# Patient Record
Sex: FEMALE | Race: WHITE | Employment: FULL TIME | URBAN - METROPOLITAN AREA
[De-identification: names, ages, dates, MRNs, and addresses within clinical notes are randomized per-mention and may not be internally consistent; named-entity substitution may affect disease eponyms.]

---

## 2019-03-29 ENCOUNTER — TELEPHONE (OUTPATIENT)
Dept: FAMILY MEDICINE CLINIC | Age: 44
End: 2019-03-29

## 2019-03-29 NOTE — TELEPHONE ENCOUNTER
Pt stopped in asking fro rx of flexeril and medrol rosanne for back pain, was at Dr Royce Lucas office hand this is what he recommended. For back pain  Pt uses [facility].     cpb

## 2019-04-02 ENCOUNTER — OFFICE VISIT (OUTPATIENT)
Dept: FAMILY MEDICINE CLINIC | Age: 44
End: 2019-04-02
Payer: COMMERCIAL

## 2019-04-02 VITALS
RESPIRATION RATE: 16 BRPM | HEIGHT: 64 IN | SYSTOLIC BLOOD PRESSURE: 108 MMHG | WEIGHT: 193.6 LBS | DIASTOLIC BLOOD PRESSURE: 72 MMHG | HEART RATE: 76 BPM | TEMPERATURE: 98.4 F | BODY MASS INDEX: 33.05 KG/M2

## 2019-04-02 DIAGNOSIS — M54.50 ACUTE LOW BACK PAIN WITHOUT SCIATICA, UNSPECIFIED BACK PAIN LATERALITY: Primary | ICD-10-CM

## 2019-04-02 PROCEDURE — 99213 OFFICE O/P EST LOW 20 MIN: CPT | Performed by: FAMILY MEDICINE

## 2019-04-02 RX ORDER — CYCLOBENZAPRINE HCL 10 MG
10 TABLET ORAL 3 TIMES DAILY PRN
Qty: 30 TABLET | Refills: 0 | Status: SHIPPED | OUTPATIENT
Start: 2019-04-02 | End: 2019-04-12

## 2019-04-02 RX ORDER — METHYLPREDNISOLONE 4 MG/1
TABLET ORAL
Qty: 1 KIT | Refills: 0 | Status: SHIPPED | OUTPATIENT
Start: 2019-04-02 | End: 2019-04-08

## 2019-04-02 ASSESSMENT — ENCOUNTER SYMPTOMS
GASTROINTESTINAL NEGATIVE: 1
BACK PAIN: 1
ABDOMINAL PAIN: 0
RESPIRATORY NEGATIVE: 1

## 2019-04-02 ASSESSMENT — PATIENT HEALTH QUESTIONNAIRE - PHQ9
SUM OF ALL RESPONSES TO PHQ QUESTIONS 1-9: 0
SUM OF ALL RESPONSES TO PHQ9 QUESTIONS 1 & 2: 0
1. LITTLE INTEREST OR PLEASURE IN DOING THINGS: 0
SUM OF ALL RESPONSES TO PHQ QUESTIONS 1-9: 0
2. FEELING DOWN, DEPRESSED OR HOPELESS: 0

## 2019-04-02 NOTE — PROGRESS NOTES
Chief Complaint   Patient presents with    Lower Back Pain     Present x 4 weeks. Lifted a laundry basket and has had issues since. Seeing chiropractor. To have MRI Thursday @ [facility]. Denies numbness and tingling. Using Advil with some benefit. SUBJECTIVE     Tatiana Bearden is a 41 y. o.female      Pt complains of a 4 week h/o low back pain that is non-radiating. Started after she bent over while picking up a laundry basket. Felt immediate pain. Denies numbness, tingling, weakness. Has been doing chiropractics. Has MRI scheduled for later this week as ordered by her chiropractor. No previous h/o back issues. Has been using Aleve without much relief. C/o tightness in the back. Unable to rest well due to pain. Denies bowel or bladder problems. Review of Systems   Constitutional: Negative for fatigue and fever. HENT: Negative. Respiratory: Negative. Cardiovascular: Negative. Gastrointestinal: Negative. Negative for abdominal pain. Genitourinary: Negative. Negative for flank pain and hematuria. Musculoskeletal: Positive for back pain. Neurological: Negative for weakness and numbness. All other systems reviewed and are negative. OBJECTIVE     /72 (Site: Right Upper Arm, Position: Sitting, Cuff Size: Medium Adult)   Pulse 76   Temp 98.4 °F (36.9 °C) (Oral)   Resp 16   Ht 5' 4\" (1.626 m)   Wt 193 lb 9.6 oz (87.8 kg)   BMI 33.23 kg/m²     Physical Exam   Constitutional: She appears well-developed and well-nourished. HENT:   Right Ear: Tympanic membrane normal.   Left Ear: Tympanic membrane normal.   Mouth/Throat: Oropharynx is clear and moist.   Cardiovascular: Normal rate and regular rhythm. No murmur heard. Pulmonary/Chest: Breath sounds normal. She has no wheezes. Musculoskeletal: She exhibits no edema. Lumbar back: She exhibits decreased range of motion and spasm. She exhibits no bony tenderness.         Back:    Lymphadenopathy:     She has no cervical adenopathy. Vitals reviewed. ASSESSMENT      1.  Acute low back pain without sciatica, unspecified back pain laterality        PLAN     Requested Prescriptions     Signed Prescriptions Disp Refills    cyclobenzaprine (FLEXERIL) 10 MG tablet 30 tablet 0     Sig: Take 1 tablet by mouth 3 times daily as needed for Muscle spasms    methylPREDNISolone (MEDROL DOSEPACK) 4 MG tablet 1 kit 0     Sig: Take by mouth as directed         Follow up if not better            Electronically signed by Gonzalo Luke MD on 4/2/2019 at 1:36 PM

## 2020-01-21 ENCOUNTER — HOSPITAL ENCOUNTER (OUTPATIENT)
Dept: WOMENS IMAGING | Age: 45
Discharge: HOME | End: 2020-01-21
Payer: COMMERCIAL

## 2020-01-21 PROCEDURE — 77063 BREAST TOMOSYNTHESIS BI: CPT

## 2020-01-24 ENCOUNTER — HOSPITAL ENCOUNTER (OUTPATIENT)
Dept: WOMENS IMAGING | Age: 45
Discharge: HOME | End: 2020-01-24
Payer: COMMERCIAL

## 2020-01-24 PROCEDURE — G0279 TOMOSYNTHESIS, MAMMO: HCPCS

## 2020-01-24 PROCEDURE — 76642 ULTRASOUND BREAST LIMITED: CPT

## 2020-04-30 ENCOUNTER — E-VISIT (OUTPATIENT)
Dept: FAMILY MEDICINE CLINIC | Facility: CLINIC | Age: 45
End: 2020-04-30
Payer: COMMERCIAL

## 2020-04-30 PROCEDURE — 98970 NQHP OL DIG ASSMT&MGMT 5-10: CPT | Performed by: NURSE PRACTITIONER

## 2020-09-29 ENCOUNTER — HOSPITAL ENCOUNTER (OUTPATIENT)
Dept: WOMENS IMAGING | Age: 45
Discharge: HOME | End: 2020-09-29
Payer: COMMERCIAL

## 2020-09-29 ENCOUNTER — HOSPITAL ENCOUNTER (OUTPATIENT)
Dept: WOMENS IMAGING | Age: 45
End: 2020-09-29
Payer: COMMERCIAL

## 2020-09-29 PROCEDURE — G0279 TOMOSYNTHESIS, MAMMO: HCPCS

## 2021-01-07 ENCOUNTER — TELEPHONE (OUTPATIENT)
Dept: FAMILY MEDICINE CLINIC | Age: 46
End: 2021-01-07

## 2021-01-07 DIAGNOSIS — Z20.822 EXPOSURE TO COVID-19 VIRUS: Primary | ICD-10-CM

## 2021-01-07 NOTE — TELEPHONE ENCOUNTER
Patient with recent exposure to COVID and is requesting testing at this time. Asymptomatic. Would like to go to [facility]. Order faxed and patient notified.

## 2021-01-08 LAB — SARS-COV-2: NOT DETECTED

## 2021-02-22 ENCOUNTER — VIRTUAL VISIT (OUTPATIENT)
Dept: FAMILY MEDICINE CLINIC | Age: 46
End: 2021-02-22
Payer: COMMERCIAL

## 2021-02-22 ENCOUNTER — HOSPITAL ENCOUNTER (OUTPATIENT)
Age: 46
Setting detail: SPECIMEN
Discharge: HOME | End: 2021-02-22
Payer: COMMERCIAL

## 2021-02-22 DIAGNOSIS — R51.9 NONINTRACTABLE HEADACHE, UNSPECIFIED CHRONICITY PATTERN, UNSPECIFIED HEADACHE TYPE: ICD-10-CM

## 2021-02-22 DIAGNOSIS — R05.9 COUGH: ICD-10-CM

## 2021-02-22 DIAGNOSIS — R52 BODY ACHES: ICD-10-CM

## 2021-02-22 DIAGNOSIS — R50.9 FEVER, UNSPECIFIED FEVER CAUSE: ICD-10-CM

## 2021-02-22 DIAGNOSIS — Z20.822 SUSPECTED COVID-19 VIRUS INFECTION: ICD-10-CM

## 2021-02-22 DIAGNOSIS — Z20.822 SUSPECTED COVID-19 VIRUS INFECTION: Primary | ICD-10-CM

## 2021-02-22 PROCEDURE — 99213 OFFICE O/P EST LOW 20 MIN: CPT | Performed by: NURSE PRACTITIONER

## 2021-02-22 PROCEDURE — G8427 DOCREV CUR MEDS BY ELIG CLIN: HCPCS | Performed by: NURSE PRACTITIONER

## 2021-02-22 PROCEDURE — U0003 INFECTIOUS AGENT DETECTION BY NUCLEIC ACID (DNA OR RNA); SEVERE ACUTE RESPIRATORY SYNDROME CORONAVIRUS 2 (SARS-COV-2) (CORONAVIRUS DISEASE [COVID-19]), AMPLIFIED PROBE TECHNIQUE, MAKING USE OF HIGH THROUGHPUT TECHNOLOGIES AS DESCRIBED BY CMS-2020-01-R: HCPCS

## 2021-02-22 ASSESSMENT — ENCOUNTER SYMPTOMS
VOMITING: 0
SHORTNESS OF BREATH: 0
DIARRHEA: 1
BLOOD IN STOOL: 0
CONSTIPATION: 0
COUGH: 1
NAUSEA: 1
RHINORRHEA: 1

## 2021-02-22 NOTE — PATIENT INSTRUCTIONS
Patient Education         COVID-19: Taking Care of Yourself If You Have It (02:58)  Your health professional recommends that you watch this short online health video. Learn how to take care of yourself if you have COVID-19 and find out ways to prevent spreading it to others. How to watch the video    Scan the QR code   OR Visit the website    https://[email or web address]   Current as of: December 18, 2020               Content Version: 12.7  © 2006-2021 Healthwise, Incorporated. Care instructions adapted under license by [facility] ([facility]). If you have questions about a medical condition or this instruction, always ask your healthcare professional. [address] any warranty or liability for your use of this information.

## 2021-02-24 LAB — SARS-COV-2: DETECTED

## 2021-03-02 ENCOUNTER — OFFICE VISIT (OUTPATIENT)
Dept: FAMILY MEDICINE CLINIC | Age: 46
End: 2021-03-02
Payer: COMMERCIAL

## 2021-03-02 ENCOUNTER — NURSE TRIAGE (OUTPATIENT)
Dept: OTHER | Facility: CLINIC | Age: 46
End: 2021-03-02

## 2021-03-02 ENCOUNTER — HOSPITAL ENCOUNTER (OUTPATIENT)
Dept: INTERVENTIONAL RADIOLOGY/VASCULAR | Age: 46
Discharge: HOME | End: 2021-03-02
Payer: COMMERCIAL

## 2021-03-02 ENCOUNTER — TELEPHONE (OUTPATIENT)
Dept: FAMILY MEDICINE CLINIC | Age: 46
End: 2021-03-02

## 2021-03-02 VITALS
DIASTOLIC BLOOD PRESSURE: 76 MMHG | TEMPERATURE: 96.8 F | BODY MASS INDEX: 31.48 KG/M2 | WEIGHT: 183.4 LBS | HEART RATE: 80 BPM | SYSTOLIC BLOOD PRESSURE: 104 MMHG | RESPIRATION RATE: 16 BRPM

## 2021-03-02 DIAGNOSIS — Z86.16 HISTORY OF COVID-19: ICD-10-CM

## 2021-03-02 DIAGNOSIS — M79.661 RIGHT CALF PAIN: Primary | ICD-10-CM

## 2021-03-02 DIAGNOSIS — M79.661 RIGHT CALF PAIN: ICD-10-CM

## 2021-03-02 PROCEDURE — G8482 FLU IMMUNIZE ORDER/ADMIN: HCPCS | Performed by: NURSE PRACTITIONER

## 2021-03-02 PROCEDURE — 93971 EXTREMITY STUDY: CPT

## 2021-03-02 PROCEDURE — G8427 DOCREV CUR MEDS BY ELIG CLIN: HCPCS | Performed by: NURSE PRACTITIONER

## 2021-03-02 PROCEDURE — 1036F TOBACCO NON-USER: CPT | Performed by: NURSE PRACTITIONER

## 2021-03-02 PROCEDURE — 99213 OFFICE O/P EST LOW 20 MIN: CPT | Performed by: NURSE PRACTITIONER

## 2021-03-02 PROCEDURE — G8417 CALC BMI ABV UP PARAM F/U: HCPCS | Performed by: NURSE PRACTITIONER

## 2021-03-02 ASSESSMENT — ENCOUNTER SYMPTOMS
CONSTIPATION: 0
NAUSEA: 0
VOMITING: 0
SHORTNESS OF BREATH: 0
BLOOD IN STOOL: 0
DIARRHEA: 0

## 2021-03-02 NOTE — PROGRESS NOTES
Chief Complaint   Patient presents with    Leg Pain     Right leg pain since yesterday. Worse pain this morning. No redness or warmth. Unsure of swelling. Recent COVID infection. KSAHMIR Cope is a 39 y. o.female      Pt complains of right leg pain and tightness starting yesterday. Reports the pain is \"pretty bad\". Denies injury. She did have covid and just got out of quarantine. She is unsure if there is any edema. Denies sob or history of DVT. Review of Systems   Constitutional: Negative for chills, diaphoresis and fever. Respiratory: Negative for shortness of breath. Cardiovascular: Negative for chest pain, palpitations and leg swelling. Gastrointestinal: Negative for blood in stool, constipation, diarrhea, nausea and vomiting. Genitourinary: Negative for dysuria and hematuria. Musculoskeletal: Negative for myalgias. Right calf pain. + Yolande's   Neurological: Negative for dizziness and headaches. All other systems reviewed and are negative. OBJECTIVE     /76   Pulse 80   Temp 96.8 °F (36 °C) (Temporal)   Resp 16   Wt 183 lb 6.4 oz (83.2 kg)   BMI 31.48 kg/m²     Physical Exam  Vitals signs and nursing note reviewed. Constitutional:       Appearance: She is well-developed. HENT:      Head: Normocephalic and atraumatic. Right Ear: External ear normal.      Left Ear: External ear normal.      Nose: Nose normal.   Eyes:      Conjunctiva/sclera: Conjunctivae normal.      Pupils: Pupils are equal, round, and reactive to light. Neck:      Musculoskeletal: Normal range of motion and neck supple. Cardiovascular:      Rate and Rhythm: Normal rate and regular rhythm. Heart sounds: Normal heart sounds. Pulmonary:      Effort: Pulmonary effort is normal.      Breath sounds: Normal breath sounds. Abdominal:      General: Bowel sounds are normal.      Palpations: Abdomen is soft. Musculoskeletal: Normal range of motion. General: Swelling (mild, posterior right calf below knee) and tenderness (posterior right calf below knee) present. Skin:     General: Skin is warm and dry. Neurological:      Mental Status: She is alert and oriented to person, place, and time. Deep Tendon Reflexes: Reflexes are normal and symmetric. Psychiatric:         Behavior: Behavior normal.         Thought Content: Thought content normal.         Judgment: Judgment normal.           No results found for this visit on 03/02/21. ASSESSMENT       Diagnosis Orders   1. Right calf pain  VL DUP LOWER EXTREMITY VENOUS RIGHT   2. History of COVID-19  VL DUP LOWER EXTREMITY VENOUS RIGHT       PLAN         Orders Placed This Encounter   Procedures    VL DUP LOWER EXTREMITY VENOUS RIGHT     Standing Status:   Future     Standing Expiration Date:   3/2/2022     Order Specific Question:   Reason for exam:     Answer:   right lower leg pain. Covid 2 weeks ago.  no injury       Stat doppler  Further plan pending above results        Electronically signed by KELLY Cmapbell CNP on 3/2/2021 at 11:50 AM

## 2021-03-02 NOTE — TELEPHONE ENCOUNTER
----- Message from KELLY Batres CNP sent at 3/2/2021  1:49 PM EST -----  +DVT in right gastrocnemius vein. Please send in Eliquis 5mg 2 tabs BID x7 days #28 tabs. Then 5 mg BID daily. She will need to be on this for 3-6 months and we will reevaluate at that time. To ED with increased pain or SOB. Follow up in 4 weeks.  Thanks, TS

## 2021-03-02 NOTE — PATIENT INSTRUCTIONS
Patient Education        Doppler Ultrasound: About This Test  What is it? An ultrasound uses reflected sound waves to produce a picture of organs and blood vessels. The sound waves create a picture on a video screen. Doppler ultrasound is a special kind of ultrasound. It can detect movement of blood through arteries and veins. Some ultrasound tests are called \"duplex. \" Duplex means \"two parts. \" A duplex ultrasound combines the Doppler ultrasound with the more common ultrasound. The combination can help the doctor see more clearly what's going on. There are no risks linked to an ultrasound test, and it is safe for pregnant women. It won't harm the baby (fetus). Why is this test done? You might have a Doppler ultrasound to:  · Look for reduced blood flow in major neck arteries. Low blood flow in these arteries can cause a stroke. · Find a blood clot in leg veins, which could be a deep vein thrombosis. · Check blood flow in a fetus to check the health of the fetus. · Find a blockage or a narrowing in the arteries that go to the kidneys. How do you prepare for the test?  Depending on what the test is for, you may be asked not to eat or drink after midnight before the test. Or you may be asked to drink water right before the test so that your bladder is full. How is the test done? The doctor or ultrasound technologist will have you lie on your back, side, or stomach, depending on which part of your body is being examined. · A gel will be applied to your skin. This helps the passage of sound waves. · A hand-held device called a transducer will be moved along your skin. · You'll need to stay still during the test.  How long does the test take? The test will take 30 to 60 minutes. What happens after the test?  · The scans from the test will be read within a short time, in case a repeat test is needed. · You will probably be able to go home as soon as the test has been read. · You can go back to your usual activities right away. Follow-up care is a key part of your treatment and safety. Be sure to make and go to all appointments, and call your doctor if you are having problems. It's also a good idea to keep a list of the medicines you take. Ask your doctor when you can expect to have your test results. Where can you learn more? Go to [email or web address] and sign in to your [employer] App account. Enter P816 in the StyleShare box to learn more about \"Doppler Ultrasound: About This Test.\"     If you do not have an account, please click on the \"Sign Up Now\" link. Current as of: December 9, 2019               Content Version: 12.6  © 5181-4577 [employer]. Care instructions adapted under license by [facility] ([facility]). If you have questions about a medical condition or this instruction, always ask your healthcare professional. [place] [address] any warranty or liability for your use of this information.

## 2021-03-02 NOTE — TELEPHONE ENCOUNTER
Per VO of Lynne Acuña CNP she states to send #60/2 refills on the BId dosing as well. Medications both sent and pt notified of this.

## 2021-03-02 NOTE — TELEPHONE ENCOUNTER
Patient called Tanner Vora at [facility])  with red flag complaint. Brief description of triage: Left calf pain since yesterday that is constant and rates 6/10. States out of quarantine from covid on 2/27 and has been laying around x 10 days. Denies numbness, weakness in her extremity. Triage indicates for patient to go to [facility] or PCP with approval.     2nd level triage performed with Amanda Cronin NP, after discussing pt's reason for call and assessment, Vinh Huff NP states pt should be seen today by her PCP. Discussed with patient that if she is unable to get an apt today, another option is an [facility] or ER. Care advice provided, patient verbalizes understanding; denies any other questions or concerns; instructed to call back for any new or worsening symptoms. Writer provided warm transfer to [facility]  at [facility] for appointment scheduling. Attention Provider: Thank you for allowing me to participate in the care of your patient. The patient was connected to triage in response to information provided to the ECC. Please do not respond through this encounter as the response is not directed to a shared pool. Reason for Disposition   Thigh or calf pain in only one leg and present > 1 hour    Answer Assessment - Initial Assessment Questions  1. ONSET: \"When did the pain start? \"       Yesterday     2. LOCATION: \"Where is the pain located? \"       Behind her left knee and in her calf     3. PAIN: \"How bad is the pain? \"    (Scale 1-10; or mild, moderate, severe)    -  MILD (1-3): doesn't interfere with normal activities     -  MODERATE (4-7): interferes with normal activities (e.g., work or school) or awakens from sleep, limping     -  SEVERE (8-10): excruciating pain, unable to do any normal activities, unable to walk      Rates pain currently 6/10. This morning it was an 8/10 when she woke up. Feels like a constant michael horse in her calf. She did take tylenol for the pain.      4. WORK OR EXERCISE: \"Has there been any recent work or exercise that involved this part of the body? \"       No recent work or exercise. States she has been laying around in bed due to covid dx. She is out of quarantine as of 2/27 and states all of her sx have improved. Denies fever. 5. CAUSE: \"What do you think is causing the leg pain? \"      Unsure     6. OTHER SYMPTOMS: \"Do you have any other symptoms? \" (e.g., chest pain, back pain, breathing difficulty, swelling, rash, fever, numbness, weakness)  Denies any other symptoms. 7. PREGNANCY: \"Is there any chance you are pregnant? \" \"When was your last menstrual period? \"    Protocols used: LEG PAIN-ADULT-[place]

## 2021-03-03 ENCOUNTER — TELEPHONE (OUTPATIENT)
Dept: FAMILY MEDICINE CLINIC | Age: 46
End: 2021-03-03

## 2021-03-03 NOTE — TELEPHONE ENCOUNTER
Pt stopped by the office stating that she went to the pharmacy to  the Rx for Eliquis and it will cost her $700.00. She wanted to know if we had a co-pay card or samples. We have no samples or co-pay cards. We did receive a fax from [employer] stating that the medication requires a PA so I competed that on Cover My Meds and it was approved. I called [facility] and spoke to Laura Lozano and she stated that both Rx's are covered with a $30.00 co-pay. Pt notified. Pt had additional questions regarding activity and pain control for the DVT: per CG pt notified OK to use Tylenol and resume normal daily activities (no strenuous exercise or stretching). She may also use compression stockings. Pt notified and verbalized understanding.

## 2021-03-15 ENCOUNTER — TELEPHONE (OUTPATIENT)
Dept: FAMILY MEDICINE CLINIC | Age: 46
End: 2021-03-15

## 2021-03-15 NOTE — TELEPHONE ENCOUNTER
Pt notified of CG response. She wants to know an estimate of how long these symptoms may last. Also wants to know if she is able to take Melatonin or any other sleep aid to help her sleep. Please advise.

## 2021-03-15 NOTE — TELEPHONE ENCOUNTER
The symptoms that she is describing are not common with Eliquis, but are common after COVID infections. Brain fog, blood clots, fatigue, joint and body aches have been identified as post-COVID symptoms/complications.   CG

## 2021-03-15 NOTE — TELEPHONE ENCOUNTER
C/O joint pain (hips, shoulders, knees, wrists), trouble staying asleep and brain fog with feeling overwhelmed x 2 weeks. The pt was diagnosed with a RLE DVT on 3/2/21 and was prescribed Eliquis. Pt states the right calf no longer hurts and is doing fine but has noticed the above symptoms since being diagnosed with the DVT. Pt wants to know if this is normal after being diagnosed with a DVT or if the symptoms could be a side effect of the Eliquis. Please advise. Call pt back at [phone], can leave message.

## 2021-03-23 ENCOUNTER — OFFICE VISIT (OUTPATIENT)
Dept: FAMILY MEDICINE CLINIC | Age: 46
End: 2021-03-23
Payer: COMMERCIAL

## 2021-03-23 VITALS
BODY MASS INDEX: 31.51 KG/M2 | SYSTOLIC BLOOD PRESSURE: 104 MMHG | TEMPERATURE: 97.9 F | DIASTOLIC BLOOD PRESSURE: 72 MMHG | HEART RATE: 76 BPM | RESPIRATION RATE: 16 BRPM | WEIGHT: 183.6 LBS

## 2021-03-23 DIAGNOSIS — H93.A2 PULSATILE TINNITUS OF LEFT EAR: ICD-10-CM

## 2021-03-23 DIAGNOSIS — U07.1 COVID-19 VIRUS INFECTION: Primary | ICD-10-CM

## 2021-03-23 DIAGNOSIS — G47.01 INSOMNIA DUE TO MEDICAL CONDITION: ICD-10-CM

## 2021-03-23 DIAGNOSIS — R53.83 OTHER FATIGUE: ICD-10-CM

## 2021-03-23 DIAGNOSIS — R41.89 BRAIN FOG: ICD-10-CM

## 2021-03-23 PROCEDURE — G8417 CALC BMI ABV UP PARAM F/U: HCPCS | Performed by: FAMILY MEDICINE

## 2021-03-23 PROCEDURE — 99214 OFFICE O/P EST MOD 30 MIN: CPT | Performed by: FAMILY MEDICINE

## 2021-03-23 PROCEDURE — G8482 FLU IMMUNIZE ORDER/ADMIN: HCPCS | Performed by: FAMILY MEDICINE

## 2021-03-23 PROCEDURE — 1036F TOBACCO NON-USER: CPT | Performed by: FAMILY MEDICINE

## 2021-03-23 PROCEDURE — G8427 DOCREV CUR MEDS BY ELIG CLIN: HCPCS | Performed by: FAMILY MEDICINE

## 2021-03-23 RX ORDER — ZOLPIDEM TARTRATE 5 MG/1
TABLET ORAL
Qty: 10 TABLET | Refills: 0 | Status: SHIPPED | OUTPATIENT
Start: 2021-03-23 | End: 2021-04-06

## 2021-03-23 NOTE — PROGRESS NOTES
3/23/2021    Dulce Jackson (:  1975) is a 39 y.o. female,Established patient, here for evaluation of the following chief complaint(s):  Joint Pain (Continued joint pain and brain fog since recent COVID infection. Has now developed a \"buzzing\" in the left ear for the past week. Can feel her pulse in her ear. ) and Insomnia (Has tried Tylenol PM as recommended but it is not helping. )      ASSESSMENT/PLAN:    1. COVID-19 virus infection  -     CBC Auto Differential; Future  -     Comprehensive Metabolic Panel; Future  2. Other fatigue  -     CBC Auto Differential; Future  -     Comprehensive Metabolic Panel; Future  -     TSH without Reflex; Future  -     T4, Free; Future  3. Brain fog  4. Pulsatile tinnitus of left ear  5. Insomnia due to medical condition  -     zolpidem (AMBIEN) 5 MG tablet; 1/2 or 1 po qhs prn insomnia, Disp-10 tablet, R-0Print      Will put her off work x 2 weeks to start    Ambien on a short term basis for sleep. OARRS report reviewed and no contraindications or problems noted. Labs as above    Follow up in 10-14 days to reassess    SUBJECTIVE/OBJECTIVE:    HPI    Here with ongoing symptoms of fatigue, insomnia, arthralgias, brain fog, lack of concentration and intermittent pulsatile tinnitus since her COVID 19 infection last month. She has returned to work but is finding it difficult to function. Has tried OTC meds to help with sleep but it hasn't helped at all. She is exhausted, tearful, anxious. C/o intermittent pulsatile tinnitus in the left ear. No hearing loss. No dizziness. She is doing well on eliquis for her DVT and states that her leg is much better. Nonsmoker. Body mass index is 31.51 kg/m². Review of Systems   Constitutional: Positive for activity change, appetite change and fatigue. Negative for fever. HENT: Positive for tinnitus. Negative for hearing loss. Respiratory: Negative for cough and shortness of breath. Cardiovascular: Negative. Gastrointestinal: Negative. Musculoskeletal: Positive for arthralgias. Neurological: Negative for dizziness. Psychiatric/Behavioral: Positive for decreased concentration and sleep disturbance. The patient is nervous/anxious. All other systems reviewed and are negative. Vitals:    03/23/21 1030   BP: 104/72   Pulse: 76   Resp: 16   Temp: 97.9 °F (36.6 °C)   TempSrc: Temporal   Weight: 183 lb 9.6 oz (83.3 kg)       Wt Readings from Last 3 Encounters:   03/23/21 183 lb 9.6 oz (83.3 kg)   03/02/21 183 lb 6.4 oz (83.2 kg)   04/02/19 193 lb 9.6 oz (87.8 kg)       BP Readings from Last 3 Encounters:   03/23/21 104/72   03/02/21 104/76   04/02/19 108/72       Physical Exam  Vitals signs reviewed. Constitutional:       General: She is not in acute distress. Appearance: She is well-developed. HENT:      Head: Normocephalic and atraumatic. Right Ear: Tympanic membrane normal.      Left Ear: Tympanic membrane normal.      Mouth/Throat:      Mouth: Mucous membranes are moist.      Pharynx: No posterior oropharyngeal erythema. Eyes:      Conjunctiva/sclera: Conjunctivae normal.   Cardiovascular:      Rate and Rhythm: Normal rate and regular rhythm. Heart sounds: No murmur. Pulmonary:      Breath sounds: Normal breath sounds. No wheezing. Abdominal:      Palpations: Abdomen is soft. Tenderness: There is no abdominal tenderness. Musculoskeletal:      Right lower leg: No edema. Left lower leg: No edema. Lymphadenopathy:      Cervical: No cervical adenopathy. Neurological:      Mental Status: She is alert. Psychiatric:         Mood and Affect: Affect is tearful. An electronic signature was used to authenticate this note.         Electronically signed by Arvin Payton MD on 3/23/2021 at 12:57 PM

## 2021-03-23 NOTE — LETTER
[address] [place] [address]  Phone: [phone]  Fax: [phone]    Luz Floyd MD        March 23, 2021     Patient: Logan Claros   YOB: 1975   Date of Visit: 3/23/2021       To Whom It May Concern: It is my medical opinion that Dania Nice should remain out of work until 4/6/2021. If you have any questions or concerns, please don't hesitate to call.     Sincerely,        Luz Floyd MD

## 2021-03-24 ENCOUNTER — HOSPITAL ENCOUNTER (OUTPATIENT)
Age: 46
Discharge: HOME | End: 2021-03-24
Payer: COMMERCIAL

## 2021-03-24 DIAGNOSIS — U07.1 COVID-19 VIRUS INFECTION: ICD-10-CM

## 2021-03-24 DIAGNOSIS — R53.83 OTHER FATIGUE: ICD-10-CM

## 2021-03-24 LAB
BASOPHILS # BLD: 0.7 %
BASOPHILS ABSOLUTE: 0.1 THOU/MM3 (ref 0–0.1)
EOSINOPHIL # BLD: 0.7 %
EOSINOPHILS ABSOLUTE: 0.1 THOU/MM3 (ref 0–0.4)
ERYTHROCYTE [DISTWIDTH] IN BLOOD BY AUTOMATED COUNT: 13.6 % (ref 11.5–14.5)
ERYTHROCYTE [DISTWIDTH] IN BLOOD BY AUTOMATED COUNT: 47 FL (ref 35–45)
HCT VFR BLD CALC: 45.2 % (ref 37–47)
HEMOGLOBIN: 14.1 GM/DL (ref 12–16)
IMMATURE GRANS (ABS): 0.02 THOU/MM3 (ref 0–0.07)
IMMATURE GRANULOCYTES: 0.3 %
LYMPHOCYTES # BLD: 34.7 %
LYMPHOCYTES ABSOLUTE: 2.5 THOU/MM3 (ref 1–4.8)
MCH RBC QN AUTO: 29.5 PG (ref 26–33)
MCHC RBC AUTO-ENTMCNC: 31.2 GM/DL (ref 32.2–35.5)
MCV RBC AUTO: 94.6 FL (ref 81–99)
MONOCYTES # BLD: 6.2 %
MONOCYTES ABSOLUTE: 0.4 THOU/MM3 (ref 0.4–1.3)
NUCLEATED RED BLOOD CELLS: 0 /100 WBC
PLATELET # BLD: 266 THOU/MM3 (ref 130–400)
PMV BLD AUTO: 10.2 FL (ref 9.4–12.4)
RBC # BLD: 4.78 MILL/MM3 (ref 4.2–5.4)
SEG NEUTROPHILS: 57.4 %
SEGMENTED NEUTROPHILS ABSOLUTE COUNT: 4.1 THOU/MM3 (ref 1.8–7.7)
WBC # BLD: 7.2 THOU/MM3 (ref 4.8–10.8)

## 2021-03-24 PROCEDURE — 36415 COLL VENOUS BLD VENIPUNCTURE: CPT

## 2021-03-24 PROCEDURE — 80053 COMPREHEN METABOLIC PANEL: CPT

## 2021-03-24 PROCEDURE — 85025 COMPLETE CBC W/AUTO DIFF WBC: CPT

## 2021-03-24 PROCEDURE — 84443 ASSAY THYROID STIM HORMONE: CPT

## 2021-03-24 PROCEDURE — 84439 ASSAY OF FREE THYROXINE: CPT

## 2021-03-24 ASSESSMENT — ENCOUNTER SYMPTOMS
COUGH: 0
SHORTNESS OF BREATH: 0
GASTROINTESTINAL NEGATIVE: 1

## 2021-03-25 LAB
ALBUMIN SERPL-MCNC: 4.6 G/DL (ref 3.5–5.1)
ALP BLD-CCNC: 67 U/L (ref 38–126)
ALT SERPL-CCNC: 24 U/L (ref 11–66)
ANION GAP SERPL CALCULATED.3IONS-SCNC: 12 MEQ/L (ref 8–16)
AST SERPL-CCNC: 15 U/L (ref 5–40)
BILIRUB SERPL-MCNC: 0.6 MG/DL (ref 0.3–1.2)
BUN BLDV-MCNC: 10 MG/DL (ref 7–22)
CALCIUM SERPL-MCNC: 9.5 MG/DL (ref 8.5–10.5)
CHLORIDE BLD-SCNC: 103 MEQ/L (ref 98–111)
CO2: 23 MEQ/L (ref 23–33)
CREAT SERPL-MCNC: 0.8 MG/DL (ref 0.4–1.2)
GFR SERPL CREATININE-BSD FRML MDRD: 78 ML/MIN/1.73M2
GLUCOSE BLD-MCNC: 85 MG/DL (ref 70–108)
POTASSIUM SERPL-SCNC: 4.1 MEQ/L (ref 3.5–5.2)
SODIUM BLD-SCNC: 138 MEQ/L (ref 135–145)
T4 FREE: 1.38 NG/DL (ref 0.93–1.76)
TOTAL PROTEIN: 7.5 G/DL (ref 6.1–8)
TSH SERPL DL<=0.05 MIU/L-ACNC: 1.14 UIU/ML (ref 0.4–4.2)

## 2021-04-01 ENCOUNTER — OFFICE VISIT (OUTPATIENT)
Dept: FAMILY MEDICINE CLINIC | Age: 46
End: 2021-04-01
Payer: COMMERCIAL

## 2021-04-01 VITALS
BODY MASS INDEX: 31.41 KG/M2 | HEART RATE: 64 BPM | DIASTOLIC BLOOD PRESSURE: 70 MMHG | SYSTOLIC BLOOD PRESSURE: 104 MMHG | WEIGHT: 183 LBS | RESPIRATION RATE: 12 BRPM | TEMPERATURE: 96.8 F

## 2021-04-01 DIAGNOSIS — G47.01 INSOMNIA DUE TO MEDICAL CONDITION: ICD-10-CM

## 2021-04-01 DIAGNOSIS — R53.83 OTHER FATIGUE: Primary | ICD-10-CM

## 2021-04-01 DIAGNOSIS — R41.89 BRAIN FOG: ICD-10-CM

## 2021-04-01 DIAGNOSIS — R45.86 MOOD SWINGS: ICD-10-CM

## 2021-04-01 DIAGNOSIS — I82.461 ACUTE DEEP VEIN THROMBOSIS (DVT) OF CALF MUSCLE VEIN OF RIGHT LOWER EXTREMITY (HCC): ICD-10-CM

## 2021-04-01 PROCEDURE — 99213 OFFICE O/P EST LOW 20 MIN: CPT | Performed by: FAMILY MEDICINE

## 2021-04-01 PROCEDURE — 1036F TOBACCO NON-USER: CPT | Performed by: FAMILY MEDICINE

## 2021-04-01 PROCEDURE — G8417 CALC BMI ABV UP PARAM F/U: HCPCS | Performed by: FAMILY MEDICINE

## 2021-04-01 PROCEDURE — G8427 DOCREV CUR MEDS BY ELIG CLIN: HCPCS | Performed by: FAMILY MEDICINE

## 2021-04-01 ASSESSMENT — ENCOUNTER SYMPTOMS
RESPIRATORY NEGATIVE: 1
GASTROINTESTINAL NEGATIVE: 1

## 2021-04-01 NOTE — PROGRESS NOTES
2021    Dulce Jackson (:  1975) is a 39 y.o. female,Established patient, here for evaluation of the following chief complaint(s): Other (1 week f/u. DVT after COVID. \"Doing ok\" Ambien does seem to be working. )      ASSESSMENT/PLAN:    1. Other fatigue  2. Insomnia due to medical condition  3. Brain fog  4. Acute deep vein thrombosis (DVT) of calf muscle vein of right lower extremity (HCC)  -     VL DUP LOWER EXTREMITY VENOUS RIGHT; Future  5. Mood swings  -     sertraline (ZOLOFT) 50 MG tablet; Take 1 tablet by mouth daily, Disp-30 tablet, R-2Normal    Trial of zoloft for moods as above    Follow up lower extremity doppler in early . If negative, can stop eliquis. [id number] [address] for return to work    Return in about 6 weeks (around 2021). SUBJECTIVE/OBJECTIVE:    HPI    She reports that she's doing a little better. Her sleep is better with ambien. She feels like her brain fog is slightly improved. Energy level a little better. Her  has noticed that she's doing better. She c/o mood swings and being \"grumpy\" a lot of the time. She is interested in a trial of medication to address this short term. She feels that she can return to work as planned next week. She will be due for a follow up LE venous doppler for her DVT in early . She continues on eliquis. Review of Systems   Constitutional: Positive for fatigue. HENT: Negative. Respiratory: Negative. Cardiovascular: Negative. Gastrointestinal: Negative. Genitourinary: Negative. Musculoskeletal: Positive for arthralgias. Psychiatric/Behavioral: Positive for decreased concentration (improved), dysphoric mood and sleep disturbance (improved). The patient is nervous/anxious. All other systems reviewed and are negative.           Vitals:    21 0808   BP: 104/70   Pulse: 64   Resp: 12   Temp: 96.8 °F (36 °C)   TempSrc: Temporal   Weight: 183 lb (83 kg)       Wt Readings from Last 3 Encounters: Glucose      70 - 108 mg/dL 85   Creatinine      0.4 - 1.2 mg/dL 0.8   BUN      7 - 22 mg/dL 10   Sodium      135 - 145 meq/L 138   Potassium      3.5 - 5.2 meq/L 4.1   Chloride      98 - 111 meq/L 103   CO2      23 - 33 meq/L 23   Calcium      8.5 - 10.5 mg/dL 9.5   AST      5 - 40 U/L 15   Alk Phos      38 - 126 U/L 67   Total Protein      6.1 - 8.0 g/dL 7.5   Albumin      3.5 - 5.1 g/dL 4.6   Bilirubin      0.3 - 1.2 mg/dL 0.6   ALT      11 - 66 U/L 24   TSH      0.400 - 4.200 uIU/mL 1.140   T4 Free      0.93 - 1.76 ng/dL 1.38   Anion Gap      8.0 - 16.0 meq/L 12.0   Est, Glom Filt Rate      ml/min/1.73m2 78 (A)           An electronic signature was used to authenticate this note.         Electronically signed by Mc Geronimo MD on 4/1/2021 at 8:35 AM

## 2021-04-06 ENCOUNTER — TELEPHONE (OUTPATIENT)
Dept: FAMILY MEDICINE CLINIC | Age: 46
End: 2021-04-06

## 2021-04-06 NOTE — TELEPHONE ENCOUNTER
Patient notified and agreeable. Would like to know if we can draft a simple letter for today stating that we are extending her time off and that she will be re-evaluated prior to her RTW. If so, requesting that we email that letter to her today. ([email or web address])  [place] for letter? She will contact her HR dept to see if her FMLA will need updated.

## 2021-04-06 NOTE — LETTER
[address] [address]  Phone: [phone]  Fax: [phone]    Elba De Los Santos MD        April 6, 2021     Patient: Katt Lawler   YOB: 1975           To Whom It May Concern: It is my medical opinion that Marlon Morrison should remain out of work until 4/20/21 for a medical issue. If you have any questions or concerns, please don't hesitate to call.     Sincerely,          Elba De Los Santos MD

## 2021-04-06 NOTE — LETTER
[address] [address]  Phone: [phone]  Fax: [phone]    Mariza Rodriguez MD        April 6, 2021     Patient: Chon Chacko   YOB: 1975           To Whom It May Concern: It is my medical opinion that Debbie Blow should remain out of work until 4/20/21 for a medical issue. If you have any questions or concerns, please don't hesitate to call.     Sincerely,        Mariza Rodriguez MD

## 2021-04-06 NOTE — TELEPHONE ENCOUNTER
Patient calls today to see if she can get an extension on her FMLA. She was scheduled to return to work today but actually ended up calling in. States that she is no longer taking the Ambien to help with sleep and although things seem to be improving, she is still only getting 3-4 hours of sleep at night. She doesn't feel like she can work efficiently until her sleep improves even more. Patient did not have a timeframe in mind, would like to see what your thoughts are regarding an updated RTW. Do you want to see her again? Ok to leave a detailed VM.

## 2021-04-12 ENCOUNTER — TELEPHONE (OUTPATIENT)
Dept: FAMILY MEDICINE CLINIC | Age: 46
End: 2021-04-12

## 2021-04-12 NOTE — LETTER
[address] [address]  Phone: [phone]  Fax: [phone]    Benji Esqueda MD        April 12, 2021     Patient: Bruce Cartwright   YOB: 1975   Date of Visit: 04/01/21       To Whom it May Concern:    Sandeep Navarro was seen in my clinic on 04-01-21. She may return to work on 4-14-21. If you have any questions or concerns, please don't hesitate to call.     Sincerely,             Benji Esqueda MD

## 2021-04-12 NOTE — TELEPHONE ENCOUNTER
Pt calls in stating that she is feeling much better,. She is ready to go back to work this week. She is asking if she can go back to work this Wednesday.     email- [email or web address]

## 2021-04-20 ENCOUNTER — TELEPHONE (OUTPATIENT)
Dept: FAMILY MEDICINE CLINIC | Age: 46
End: 2021-04-20

## 2021-04-20 NOTE — TELEPHONE ENCOUNTER
The abnormal sensation could be related to the swelling from the bruise. Have her schedule an appointment if this doesn't improve soon.  CG

## 2021-05-13 ENCOUNTER — OFFICE VISIT (OUTPATIENT)
Dept: FAMILY MEDICINE CLINIC | Age: 46
End: 2021-05-13
Payer: COMMERCIAL

## 2021-05-13 VITALS
DIASTOLIC BLOOD PRESSURE: 72 MMHG | BODY MASS INDEX: 31.21 KG/M2 | HEART RATE: 76 BPM | RESPIRATION RATE: 16 BRPM | WEIGHT: 181.8 LBS | SYSTOLIC BLOOD PRESSURE: 108 MMHG

## 2021-05-13 DIAGNOSIS — R45.86 MOOD SWINGS: Primary | ICD-10-CM

## 2021-05-13 DIAGNOSIS — I82.461 ACUTE DEEP VEIN THROMBOSIS (DVT) OF CALF MUSCLE VEIN OF RIGHT LOWER EXTREMITY (HCC): ICD-10-CM

## 2021-05-13 PROCEDURE — G8417 CALC BMI ABV UP PARAM F/U: HCPCS | Performed by: FAMILY MEDICINE

## 2021-05-13 PROCEDURE — 1036F TOBACCO NON-USER: CPT | Performed by: FAMILY MEDICINE

## 2021-05-13 PROCEDURE — 99213 OFFICE O/P EST LOW 20 MIN: CPT | Performed by: FAMILY MEDICINE

## 2021-05-13 PROCEDURE — G8427 DOCREV CUR MEDS BY ELIG CLIN: HCPCS | Performed by: FAMILY MEDICINE

## 2021-05-13 ASSESSMENT — ENCOUNTER SYMPTOMS: RESPIRATORY NEGATIVE: 1

## 2021-05-13 NOTE — PROGRESS NOTES
2021    Dulce Jackson (:  1975) is a 39 y.o. female,Established patient, here for evaluation of the following chief complaint(s):  Follow-up (pt doing well, feeling better with no new concerns, )      ASSESSMENT/PLAN:    1. Mood swings  -     sertraline (ZOLOFT) 50 MG tablet; Take 1 tablet by mouth daily, Disp-90 tablet, R-3Normal  2. Acute deep vein thrombosis (DVT) of calf muscle vein of right lower extremity ([address]    She is doing significantly better and would like to continue zoloft at this point. Med refill as above. Follow up right lower extremity venous ultrasound in early . If negative, ok to stop eliquis. Follow up if not better      SUBJECTIVE/OBJECTIVE:    HPI    Here for follow up of mood swings and treatment with zoloft. She states that the medication has helped. She denies side effects with it. Her energy level is improved some and she feels like her brain fog is improved. She is back to work and functioning well. She would like to continue zoloft for now. She is due for follow up US for DVT next month. It is already scheduled for her. She continues on eliquis. Review of Systems   Constitutional: Positive for fatigue (improved). Negative for fever. HENT: Negative. Respiratory: Negative. Cardiovascular: Negative. Negative for leg swelling. Psychiatric/Behavioral: Negative for agitation and decreased concentration. The patient is not nervous/anxious. All other systems reviewed and are negative. Vitals:    21 0805   BP: 108/72   Pulse: 76   Resp: 16   Weight: 181 lb 12.8 oz (82.5 kg)       Wt Readings from Last 3 Encounters:   21 181 lb 12.8 oz (82.5 kg)   21 183 lb (83 kg)   21 183 lb 9.6 oz (83.3 kg)       BP Readings from Last 3 Encounters:   21 108/72   21 104/70   21 104/72       Physical Exam  Vitals signs reviewed. Constitutional:       General: She is not in acute distress. Appearance: She is well-developed. HENT:      Head: Normocephalic and atraumatic. Right Ear: Tympanic membrane normal.      Left Ear: Tympanic membrane normal.      Mouth/Throat:      Mouth: Mucous membranes are moist.      Pharynx: No posterior oropharyngeal erythema. Eyes:      Conjunctiva/sclera: Conjunctivae normal.   Cardiovascular:      Rate and Rhythm: Normal rate and regular rhythm. Heart sounds: No murmur. Pulmonary:      Breath sounds: Normal breath sounds. No wheezing. Musculoskeletal:      Right lower leg: No edema. Left lower leg: No edema. Lymphadenopathy:      Cervical: No cervical adenopathy. Neurological:      Mental Status: She is alert. An electronic signature was used to authenticate this note.         Electronically signed by Mariza Rodriguez MD on 5/13/2021 at 8:29 AM

## 2021-05-18 ENCOUNTER — TELEPHONE (OUTPATIENT)
Dept: FAMILY MEDICINE CLINIC | Age: 46
End: 2021-05-18

## 2021-05-18 NOTE — TELEPHONE ENCOUNTER
Pt calling asking if she can get her covid vaccine yet since she recently had covid and is on eliquis due to her recent blood clot after covid. If so do you have a preference for which one to get?  She was thinking moderna

## 2021-05-19 DIAGNOSIS — R45.86 MOOD SWINGS: ICD-10-CM

## 2021-05-19 NOTE — TELEPHONE ENCOUNTER
Pt was notified. She is to call [facility] and see if this medication be next day delivered to her home. If not she will call the office back and let us know if she needs a small local supply sent to her.

## 2021-05-19 NOTE — TELEPHONE ENCOUNTER
Rx EP'd to pharmacy. Please notify patient.       Requested Prescriptions     Signed Prescriptions Disp Refills    apixaban (ELIQUIS) 5 MG TABS tablet 180 tablet 0     Sig: Take 1 tablet by mouth 2 times daily     Authorizing Provider: Kritsie Gibbs    sertraline (ZOLOFT) 50 MG tablet 90 tablet 3     Sig: Take 1 tablet by mouth daily     Authorizing Provider: Kristie Gibbs           Electronically signed by Kishan Mullen MD on 5/19/2021 at 12:00 PM

## 2021-05-19 NOTE — TELEPHONE ENCOUNTER
Pt calling in for a refill through [facility]. She states that's he is completely out of her Eliquis after today but when speaking to [facility] they noted that they could next day her meds. meds pended    Call pt once medication is sent.

## 2021-05-19 NOTE — TELEPHONE ENCOUNTER
Pt states that she has [facility] and they will have this at her home on Friday. She will fill #4 pills at [facility] today since they do have an active Rx for her there from previous.

## 2021-06-03 ENCOUNTER — HOSPITAL ENCOUNTER (OUTPATIENT)
Dept: INTERVENTIONAL RADIOLOGY/VASCULAR | Age: 46
Discharge: HOME | End: 2021-06-03
Payer: COMMERCIAL

## 2021-06-03 ENCOUNTER — TELEPHONE (OUTPATIENT)
Dept: FAMILY MEDICINE CLINIC | Age: 46
End: 2021-06-03

## 2021-06-03 DIAGNOSIS — I82.461 ACUTE DEEP VEIN THROMBOSIS (DVT) OF CALF MUSCLE VEIN OF RIGHT LOWER EXTREMITY (HCC): ICD-10-CM

## 2021-06-03 PROCEDURE — 93971 EXTREMITY STUDY: CPT

## 2021-06-03 NOTE — TELEPHONE ENCOUNTER
----- Message from Nena Alcaraz MD sent at 6/3/2021  1:32 PM EDT -----  Kalie Members that her follow up ultrasound shows that her DVT is resolved. She can stop Eliquis.   Please update med list. CG

## 2022-03-08 ENCOUNTER — HOSPITAL ENCOUNTER (EMERGENCY)
Age: 47
Discharge: HOME | End: 2022-03-08
Payer: COMMERCIAL

## 2022-03-08 ENCOUNTER — APPOINTMENT (OUTPATIENT)
Dept: GENERAL RADIOLOGY | Age: 47
End: 2022-03-08
Payer: COMMERCIAL

## 2022-03-08 VITALS
RESPIRATION RATE: 18 BRPM | TEMPERATURE: 98.3 F | DIASTOLIC BLOOD PRESSURE: 70 MMHG | BODY MASS INDEX: 32.61 KG/M2 | OXYGEN SATURATION: 97 % | WEIGHT: 190 LBS | HEART RATE: 86 BPM | SYSTOLIC BLOOD PRESSURE: 134 MMHG

## 2022-03-08 DIAGNOSIS — S93.401A SPRAIN OF RIGHT ANKLE, UNSPECIFIED LIGAMENT, INITIAL ENCOUNTER: Primary | ICD-10-CM

## 2022-03-08 PROCEDURE — 99213 OFFICE O/P EST LOW 20 MIN: CPT | Performed by: NURSE PRACTITIONER

## 2022-03-08 PROCEDURE — 99213 OFFICE O/P EST LOW 20 MIN: CPT

## 2022-03-08 PROCEDURE — 73610 X-RAY EXAM OF ANKLE: CPT

## 2022-03-08 PROCEDURE — G0463 HOSPITAL OUTPT CLINIC VISIT: HCPCS

## 2022-03-08 ASSESSMENT — ENCOUNTER SYMPTOMS
VOMITING: 0
NAUSEA: 0

## 2022-03-08 ASSESSMENT — PAIN DESCRIPTION - PAIN TYPE: TYPE: ACUTE PAIN

## 2022-03-08 ASSESSMENT — PAIN DESCRIPTION - ORIENTATION: ORIENTATION: RIGHT

## 2022-03-08 ASSESSMENT — PAIN - FUNCTIONAL ASSESSMENT
PAIN_FUNCTIONAL_ASSESSMENT: PREVENTS OR INTERFERES SOME ACTIVE ACTIVITIES AND ADLS
PAIN_FUNCTIONAL_ASSESSMENT: 0-10

## 2022-03-08 ASSESSMENT — PAIN DESCRIPTION - DESCRIPTORS: DESCRIPTORS: DISCOMFORT

## 2022-03-08 ASSESSMENT — PAIN SCALES - GENERAL: PAINLEVEL_OUTOF10: 4

## 2022-03-08 ASSESSMENT — PAIN DESCRIPTION - ONSET: ONSET: SUDDEN

## 2022-03-08 ASSESSMENT — PAIN DESCRIPTION - LOCATION: LOCATION: ANKLE

## 2022-03-08 ASSESSMENT — PAIN DESCRIPTION - PROGRESSION: CLINICAL_PROGRESSION: NOT CHANGED

## 2022-03-08 ASSESSMENT — PAIN DESCRIPTION - FREQUENCY: FREQUENCY: CONTINUOUS

## 2022-03-08 NOTE — ED TRIAGE NOTES
Patient ambulated to room with c/o right, outer ankle pain beginning yesterday after tripping while in the parking lot of her employer. Edema and mild bruising noted to right, outer ankle.

## 2022-03-09 NOTE — ED PROVIDER NOTES
[address]  Urgent Care Encounter       CHIEF COMPLAINT       Chief Complaint   Patient presents with    Ankle Pain     right       Nurses Notes reviewed and I agree except as noted in the HPI. HISTORY OF PRESENT ILLNESS   Lawanda Rizo is a 42 y.o. female who presents with a right ankle injury that occurred yesterday at work. Patient was walking and slipped on some loose stones and rolled her ankle. She reports swelling and increased pain with walking. She has been elevating the foot as well as applying ice and taking ibuprofen. The history is provided by the patient. REVIEW OF SYSTEMS     Review of Systems   Constitutional: Negative for fever. Gastrointestinal: Negative for nausea and vomiting. Musculoskeletal:        Right ankle injury   Skin: Negative for wound. Neurological: Negative for numbness. PAST MEDICAL HISTORY         Diagnosis Date    Hyperlipidemia     IBS (irritable bowel syndrome)        SURGICALHISTORY     Patient  has a past surgical history that includes Appendectomy (2002); Gallbladder surgery (10/09); Carpal tunnel release; and  section. CURRENT MEDICATIONS       Discharge Medication List as of 3/8/2022  3:43 PM      CONTINUE these medications which have NOT CHANGED    Details   sertraline (ZOLOFT) 50 MG tablet Take 1 tablet by mouth daily, Disp-90 tablet, R-3Normal             ALLERGIES     Patient is has No Known Allergies. Patients   Immunization History   Administered Date(s) Administered    COVID-19, Fredick Yaneth, Primary or Immunocompromised, PF, 100mcg/0.5mL 2021    Influenza Virus Vaccine 2020    Influenza, MDCK Quadv, IM, PF (Flucelvax 2 yrs and older) 10/07/2020       FAMILY HISTORY     Patient's family history includes High Cholesterol in her father and mother. SOCIAL HISTORY     Patient  reports that she has never smoked.  She has never used smokeless tobacco.    PHYSICAL EXAM     ED TRIAGE VITALS  BP: 134/70, Temp: 98.3 °F (36.8 °C), Pulse: 86, Resp: 18, SpO2: 97 %,Estimated body mass index is 32.61 kg/m² as calculated from the following:    Height as of 4/2/19: 5' 4\" (1.626 m). Weight as of this encounter: 190 lb (86.2 kg). ,Patient's last menstrual period was 03/08/2022. Physical Exam  Vitals and nursing note reviewed. Constitutional:       General: She is not in acute distress. Appearance: She is well-developed. HENT:      Head: Normocephalic and atraumatic. Pulmonary:      Effort: Pulmonary effort is normal. No respiratory distress. Musculoskeletal:      Right ankle: Swelling (Lateral malleolus) present. Tenderness present over the lateral malleolus. Decreased range of motion. Normal pulse. Skin:     General: Skin is warm and dry. Neurological:      General: No focal deficit present. Mental Status: She is alert and oriented to person, place, and time. Psychiatric:         Mood and Affect: Mood normal.         Speech: Speech normal.         Behavior: Behavior normal. Behavior is cooperative. DIAGNOSTIC RESULTS     Labs:No results found for this visit on 03/08/22. IMAGING:    XR ANKLE RIGHT (MIN 3 VIEWS)   Final Result   1. No acute fracture is seen at this time. 2. Marked lateral soft tissue swelling is seen. **This report has been created using voice recognition software. It may contain minor errors which are inherent in voice recognition technology. **      Final report electronically signed by Dr Daniel Ignacio on 3/8/2022 3:30 PM            EKG:      URGENT CARE COURSE:     Vitals:    03/08/22 1453   BP: 134/70   Pulse: 86   Resp: 18   Temp: 98.3 °F (36.8 °C)   TempSrc: Temporal   SpO2: 97%   Weight: 190 lb (86.2 kg)       Medications - No data to display         PROCEDURES:  None    FINAL IMPRESSION      1. Sprain of right ankle, unspecified ligament, initial encounter          DISPOSITION/ PLAN     Patient presents with a right ankle injury.   X-ray was negative for acute fracture dislocation. The patient has a sprain of the right ankle. Ace wrap applied. RICE. Tylenol and or Motrin for pain. Activity as tolerated recommend nothing strenuous till pain is improved. Follow-up family doctor in 1 week if not improved. Further instructions were outlined verbally and in the patient's discharge instructions. All the patient's questions were answered. The patient/parent agreed with the plan and was discharged from the [facility] in good condition.       PATIENT REFERRED TO:  Judit Reynolds MD  [address] [facility] / [place] [id number]      DISCHARGE MEDICATIONS:  Discharge Medication List as of 3/8/2022  3:43 PM          Discharge Medication List as of 3/8/2022  3:43 PM          Discharge Medication List as of 3/8/2022  3:43 PM          Jose Kerr, APRCARLOTA - SHAUNA    (Please note that portions of this note were completed with a voice recognition program. Efforts were made to edit the dictations but occasionally words are mis-transcribed.)         Jose Kerr, KELLY - SHAUNA  03/08/22 2035

## 2022-11-08 ENCOUNTER — TELEPHONE (OUTPATIENT)
Dept: FAMILY MEDICINE CLINIC | Age: 47
End: 2022-11-08

## 2022-11-08 ENCOUNTER — OFFICE VISIT (OUTPATIENT)
Dept: FAMILY MEDICINE CLINIC | Age: 47
End: 2022-11-08
Payer: COMMERCIAL

## 2022-11-08 ENCOUNTER — HOSPITAL ENCOUNTER (OUTPATIENT)
Dept: INTERVENTIONAL RADIOLOGY/VASCULAR | Age: 47
Discharge: HOME | End: 2022-11-08
Payer: COMMERCIAL

## 2022-11-08 VITALS
HEART RATE: 82 BPM | BODY MASS INDEX: 32.75 KG/M2 | SYSTOLIC BLOOD PRESSURE: 108 MMHG | RESPIRATION RATE: 16 BRPM | WEIGHT: 190.8 LBS | DIASTOLIC BLOOD PRESSURE: 70 MMHG | TEMPERATURE: 98.3 F

## 2022-11-08 DIAGNOSIS — Z86.16 HISTORY OF COVID-19: ICD-10-CM

## 2022-11-08 DIAGNOSIS — M79.662 PAIN OF LEFT CALF: Primary | ICD-10-CM

## 2022-11-08 DIAGNOSIS — M79.662 PAIN OF LEFT CALF: ICD-10-CM

## 2022-11-08 DIAGNOSIS — I82.461 ACUTE DEEP VEIN THROMBOSIS (DVT) OF CALF MUSCLE VEIN OF RIGHT LOWER EXTREMITY (HCC): Primary | ICD-10-CM

## 2022-11-08 PROCEDURE — 93971 EXTREMITY STUDY: CPT

## 2022-11-08 PROCEDURE — 99213 OFFICE O/P EST LOW 20 MIN: CPT | Performed by: NURSE PRACTITIONER

## 2022-11-08 SDOH — ECONOMIC STABILITY: FOOD INSECURITY: WITHIN THE PAST 12 MONTHS, THE FOOD YOU BOUGHT JUST DIDN'T LAST AND YOU DIDN'T HAVE MONEY TO GET MORE.: PATIENT DECLINED

## 2022-11-08 SDOH — ECONOMIC STABILITY: FOOD INSECURITY: WITHIN THE PAST 12 MONTHS, YOU WORRIED THAT YOUR FOOD WOULD RUN OUT BEFORE YOU GOT MONEY TO BUY MORE.: PATIENT DECLINED

## 2022-11-08 ASSESSMENT — PATIENT HEALTH QUESTIONNAIRE - PHQ9
SUM OF ALL RESPONSES TO PHQ QUESTIONS 1-9: 0
2. FEELING DOWN, DEPRESSED OR HOPELESS: 0
SUM OF ALL RESPONSES TO PHQ QUESTIONS 1-9: 0
SUM OF ALL RESPONSES TO PHQ QUESTIONS 1-9: 0
SUM OF ALL RESPONSES TO PHQ9 QUESTIONS 1 & 2: 0
SUM OF ALL RESPONSES TO PHQ QUESTIONS 1-9: 0
1. LITTLE INTEREST OR PLEASURE IN DOING THINGS: 0

## 2022-11-08 ASSESSMENT — ENCOUNTER SYMPTOMS
DIARRHEA: 0
CONSTIPATION: 0
SHORTNESS OF BREATH: 0
VOMITING: 0
NAUSEA: 0
BLOOD IN STOOL: 0

## 2022-11-08 ASSESSMENT — SOCIAL DETERMINANTS OF HEALTH (SDOH): HOW HARD IS IT FOR YOU TO PAY FOR THE VERY BASICS LIKE FOOD, HOUSING, MEDICAL CARE, AND HEATING?: PATIENT DECLINED

## 2022-11-08 NOTE — TELEPHONE ENCOUNTER
Discussed with patient. She is agreeable to whichever therapy is preferred and covered under insurance. I spoke directly with TS for dosing instructions. She would like to try sending in Eliquis first to see if it is covered. Eliquis 5 mg, 2 tabs bid x 7 days then 5 mg bid thereafter. (Starter pack #74/NR) Continuing therapy would be Eliquis 5 mg bid thereafter. (Duration of therapy 3-6 months)  If Eliquis not covered, will send in Xarelto 15 mg bid x 21 days and 20 mg qd thereafter. Starter pack sent--covered without PA, $30 copay. Patient notified. She will call back for refill a few days before running out. TS--do you want patient to have 3 month f/u ultrasound?

## 2022-11-08 NOTE — TELEPHONE ENCOUNTER
Salbador Correa, APRN - CNP  [facility] Clinical Support Pool  Caller: Unspecified (Today,  2:51 PM)  That would be great. Repeat Doppler in 3 months.  Thanks, TS

## 2022-11-08 NOTE — TELEPHONE ENCOUNTER
----- Message from KELLY Frias CNP sent at 11/8/2022  2:22 PM EST -----  Doppler is positive for DVT in left posterior tibial veins. It looks like Eliquis is not covered by Xarelto is. Please clarify she is okay with this and I will get it sent to pharmacy.  Thanks, TS

## 2022-11-08 NOTE — PROGRESS NOTES
Scheduled for STAT hold and call LLE venous doppler at [facility] today at 1:00 PM. Pt is to arrive at the 2nd floor heart center. Pt notified and is agreeable.

## 2022-11-08 NOTE — PROGRESS NOTES
Chief Complaint   Patient presents with    Leg Pain     Left lower extremity/calf pain started this morning. KASHMIR Curran is a 41 y. o.female      Pt had Covid mid October. Pt states she woke up this morning with left lower leg pain. She does have a history of right DVT after covid almost 2 years ago. This does not feel quite the same. She does note some tingling and discomfort in the back of the calf and down to the ankle. No edema. Review of Systems   Constitutional:  Negative for chills, diaphoresis and fever. Respiratory:  Negative for shortness of breath. Cardiovascular:  Negative for chest pain, palpitations and leg swelling. Gastrointestinal:  Negative for blood in stool, constipation, diarrhea, nausea and vomiting. Genitourinary:  Negative for dysuria and hematuria. Musculoskeletal:  Negative for myalgias. Left calf discomfort and tingling   Neurological:  Negative for dizziness and headaches. All other systems reviewed and are negative. OBJECTIVE     /70 (Site: Left Upper Arm, Cuff Size: Large Adult)   Pulse 82   Temp 98.3 °F (36.8 °C) (Oral)   Resp 16   Wt 190 lb 12.8 oz (86.5 kg)   BMI 32.75 kg/m²     Physical Exam  Vitals and nursing note reviewed. Constitutional:       Appearance: She is well-developed. HENT:      Head: Normocephalic and atraumatic. Right Ear: External ear normal.      Left Ear: External ear normal.      Nose: Nose normal.   Eyes:      Conjunctiva/sclera: Conjunctivae normal.      Pupils: Pupils are equal, round, and reactive to light. Cardiovascular:      Rate and Rhythm: Normal rate and regular rhythm. Heart sounds: Normal heart sounds. Pulmonary:      Effort: Pulmonary effort is normal.      Breath sounds: Normal breath sounds. Abdominal:      General: Bowel sounds are normal.      Palpations: Abdomen is soft. Musculoskeletal:         General: Normal range of motion.       Cervical back: Normal range of motion and neck supple. Comments: Positive homans. No erythema, edema, or warmth noted. Skin:     General: Skin is warm and dry. Neurological:      Mental Status: She is alert and oriented to person, place, and time. Deep Tendon Reflexes: Reflexes are normal and symmetric. Psychiatric:         Behavior: Behavior normal.         Thought Content: Thought content normal.         Judgment: Judgment normal.         No results found for this visit on 11/08/22. ASSESSMENT       Diagnosis Orders   1. Pain of left calf  VL DUP LOWER EXTREMITY VENOUS LEFT      2.  History of COVID-19  VL DUP LOWER EXTREMITY VENOUS LEFT          PLAN         Orders Placed This Encounter   Procedures    VL DUP LOWER EXTREMITY VENOUS LEFT     Standing Status:   Future     Standing Expiration Date:   11/8/2023     Order Specific Question:   Reason for exam:     Answer:   left leg pain with history of covid and previous DVT       Stat doppler ordered given symptoms and history  Follow up and treatment pending results          Electronically signed by KELLY Cheney CNP on 11/8/2022 at 12:10 PM

## 2022-11-17 ENCOUNTER — TELEPHONE (OUTPATIENT)
Dept: FAMILY MEDICINE CLINIC | Age: 47
End: 2022-11-17

## 2022-11-18 ENCOUNTER — OFFICE VISIT (OUTPATIENT)
Dept: FAMILY MEDICINE CLINIC | Age: 47
End: 2022-11-18
Payer: COMMERCIAL

## 2022-11-18 VITALS
HEIGHT: 62 IN | HEART RATE: 84 BPM | BODY MASS INDEX: 35.41 KG/M2 | WEIGHT: 192.4 LBS | DIASTOLIC BLOOD PRESSURE: 74 MMHG | TEMPERATURE: 99 F | RESPIRATION RATE: 16 BRPM | SYSTOLIC BLOOD PRESSURE: 108 MMHG

## 2022-11-18 DIAGNOSIS — I82.409 RECURRENT ACUTE DEEP VEIN THROMBOSIS (DVT) OF LOWER EXTREMITY, UNSPECIFIED LATERALITY (HCC): ICD-10-CM

## 2022-11-18 DIAGNOSIS — R51.9 ACUTE NONINTRACTABLE HEADACHE, UNSPECIFIED HEADACHE TYPE: Primary | ICD-10-CM

## 2022-11-18 PROCEDURE — 99214 OFFICE O/P EST MOD 30 MIN: CPT | Performed by: FAMILY MEDICINE

## 2022-11-18 RX ORDER — BUTALBITAL, ACETAMINOPHEN AND CAFFEINE 50; 325; 40 MG/1; MG/1; MG/1
1 TABLET ORAL EVERY 6 HOURS PRN
Qty: 20 TABLET | Refills: 0 | Status: SHIPPED | OUTPATIENT
Start: 2022-11-18

## 2022-11-18 NOTE — PROGRESS NOTES
2022    Dulce Jackson (:  1975) is a 41 y.o. female, Established patient, here for evaluation of the following chief complaint(s):  Pain (Pain on the left side of the head x 1 week. )      ASSESSMENT/PLAN:    1. Acute nonintractable headache, unspecified headache type  -     butalbital-acetaminophen-caffeine (FIORICET, ESGIC) -40 MG per tablet; Take 1 tablet by mouth every 6 hours as needed for Headaches, Disp-20 tablet, R-0Normal  2. Recurrent acute deep vein thrombosis (DVT) of lower extremity, unspecified laterality [facility])  -     External Referral To Hematology Oncology      Trial of short-term Fioricet 1 every 6 hours as needed for headache. She will try this through the weekend and see if it helps to resolve some of her symptoms. She will call with an update on Monday. If her left-sided headache and head pain are not improved, we will proceed with imaging. Refer to hematology due to recurrent DVT. She has now had 2 lower extremity DVTs, 1 in each leg. Each was triggered by COVID infection. She relates to me today that her father has a history of multiple blood clots. A blood clotting disorder will need to be ruled out. If her symptoms worsen through the weekend, she will proceed to the nearest emergency department for evaluation and treatment    SUBJECTIVE/OBJECTIVE:    HPI    Patient in today with left-sided head pain and headache over the last week. Symptoms started after she started Eliquis for a newly discovered DVT after a recent COVID infection. She describes the pain is retro-orbital and throbbing. There is no associated dizziness, nausea, vomiting, or photophobia. She has no history of migraine. She is tried Tylenol for the headache but it has not been effective. She denies any rash in the area. She has now had 2 lower extremity DVTs, 1 in each leg, each after a COVID infection.   Today she reports to me that her father has a history of having multiple blood clots in the past.  She is not aware of any family history of any clotting disorder but she does not know if anyone has been checked for this. Review of Systems   Constitutional:  Negative for fever. HENT:  Negative for congestion. Eyes:  Negative for photophobia and visual disturbance. Respiratory: Negative. Cardiovascular: Negative. Gastrointestinal: Negative. Negative for nausea and vomiting. Genitourinary: Negative. Musculoskeletal: Negative. Skin:  Negative for rash. Neurological:  Positive for headaches. Negative for dizziness, speech difficulty, light-headedness and numbness. All other systems reviewed and are negative. Vitals:    11/18/22 1009   BP: 108/74   Site: Left Upper Arm   Pulse: 84   Resp: 16   Temp: 99 °F (37.2 °C)   TempSrc: Oral   Weight: 192 lb 6.4 oz (87.3 kg)   Height: 5' 2\" (1.575 m)       Wt Readings from Last 3 Encounters:   11/18/22 192 lb 6.4 oz (87.3 kg)   11/08/22 190 lb 12.8 oz (86.5 kg)   03/08/22 190 lb (86.2 kg)       BP Readings from Last 3 Encounters:   11/18/22 108/74   11/08/22 108/70   03/08/22 134/70       Physical Exam  Vitals reviewed. Constitutional:       General: She is not in acute distress. Appearance: She is well-developed. HENT:      Head: Normocephalic and atraumatic. Right Ear: Tympanic membrane normal.      Left Ear: Tympanic membrane normal.      Nose:      Right Sinus: No maxillary sinus tenderness or frontal sinus tenderness. Left Sinus: No maxillary sinus tenderness or frontal sinus tenderness. Mouth/Throat:      Mouth: Mucous membranes are moist.      Pharynx: No posterior oropharyngeal erythema. Eyes:      Conjunctiva/sclera: Conjunctivae normal.   Cardiovascular:      Rate and Rhythm: Normal rate and regular rhythm. Heart sounds: No murmur heard. Pulmonary:      Breath sounds: Normal breath sounds. No wheezing. Musculoskeletal:      Right lower leg: No edema.       Left lower leg: No edema.   Lymphadenopathy:      Cervical: No cervical adenopathy. Neurological:      General: No focal deficit present. Mental Status: She is alert and oriented to person, place, and time. Cranial Nerves: No cranial nerve deficit. Motor: No tremor. Coordination: Romberg sign negative. Coordination normal.      Gait: Gait normal.           An electronic signature was used to authenticate this note.         Electronically signed by Wu Lyons MD on 11/18/2022 at 1:26 PM

## 2022-11-20 ASSESSMENT — ENCOUNTER SYMPTOMS
VOMITING: 0
PHOTOPHOBIA: 0
RESPIRATORY NEGATIVE: 1
NAUSEA: 0
GASTROINTESTINAL NEGATIVE: 1

## 2022-11-21 ENCOUNTER — TELEPHONE (OUTPATIENT)
Dept: FAMILY MEDICINE CLINIC | Age: 47
End: 2022-11-21

## 2022-11-21 ENCOUNTER — HOSPITAL ENCOUNTER (OUTPATIENT)
Dept: CT IMAGING | Age: 47
Discharge: HOME | End: 2022-11-21
Payer: COMMERCIAL

## 2022-11-21 ENCOUNTER — HOSPITAL ENCOUNTER (OUTPATIENT)
Age: 47
Discharge: HOME | End: 2022-11-21
Payer: COMMERCIAL

## 2022-11-21 DIAGNOSIS — R51.9 NEW ONSET OF HEADACHES: ICD-10-CM

## 2022-11-21 DIAGNOSIS — R51.9 NEW ONSET OF HEADACHES: Primary | ICD-10-CM

## 2022-11-21 PROCEDURE — 70450 CT HEAD/BRAIN W/O DYE: CPT

## 2022-11-21 RX ORDER — UBROGEPANT 50 MG/1
TABLET ORAL
Qty: 4 TABLET | Refills: 0 | COMMUNITY
Start: 2022-11-21

## 2022-11-21 NOTE — TELEPHONE ENCOUNTER
Pt notified of the result and is agreeable to the samples of Jaclyn Hawkins, will have her  pick them up from the office today. Samples ready for  per order of CG.

## 2022-11-21 NOTE — TELEPHONE ENCOUNTER
Pt states that the Fioricet is not helping the headaches that she is having. She states her head hurt terribly this morning. She states that she has had a headache all weekend long but does lessen some throughout the day but little relief with the medication.      MEERA Doherty

## 2022-11-21 NOTE — TELEPHONE ENCOUNTER
----- Message from Ghada Key MD sent at 11/21/2022 12:22 PM EST -----  Notify her that her head CT is normal.  See if she wants to  samples of Ubrevly to try for her headache. If so, ok for 50mg po x 1. May repeat in 2 hours if needed. [id number] [address] for #4 samples if available. Call with update in 24-48 hours.   If acutely worse, to ED for eval. CG

## 2022-11-21 NOTE — TELEPHONE ENCOUNTER
CT ordered and scheduled. Testing is scheduled for today at 11/21/2022 at 11:00 am at [facility]. Pt notified and is heading there now.

## 2022-12-01 ENCOUNTER — TELEPHONE (OUTPATIENT)
Dept: FAMILY MEDICINE CLINIC | Age: 47
End: 2022-12-01

## 2022-12-01 NOTE — TELEPHONE ENCOUNTER
Pt calling in to have Eliquis 5 mg BID sent to [facility]. She stated that she has about a week left in her current starter pack.

## 2022-12-08 ENCOUNTER — TELEPHONE (OUTPATIENT)
Dept: FAMILY MEDICINE CLINIC | Age: 47
End: 2022-12-08

## 2022-12-08 NOTE — TELEPHONE ENCOUNTER
Pt called stating that CG referred her to a hematologist, Dr. Jenny Ferreira, at [facility] and he is unable to see her until 1/31/23. She was offered a sooner appt with his associate Dr. Mitul Munoz but she wants to make sure this is OK with CG since she specifically mentioned Dr. Jenny Ferreira. Please advise.

## 2022-12-09 ENCOUNTER — TELEPHONE (OUTPATIENT)
Dept: FAMILY MEDICINE CLINIC | Age: 47
End: 2022-12-09

## 2022-12-09 NOTE — TELEPHONE ENCOUNTER
Pt calls in stating that her right leg in the calf area down to her ankle feels \"different\" and similar to her last DVT. It is not painful or warm to touch. She is currently on Eliquis 5mg BID and wants to know if it possible to develop  a clot while she is on the Eliquis? Or should she be evaluated at the ER.  Pt states she is just being cautious and not necessarily symptomatic.     [phone]

## 2023-01-13 ENCOUNTER — OFFICE VISIT (OUTPATIENT)
Dept: FAMILY MEDICINE CLINIC | Age: 48
End: 2023-01-13
Payer: COMMERCIAL

## 2023-01-13 VITALS
DIASTOLIC BLOOD PRESSURE: 64 MMHG | RESPIRATION RATE: 16 BRPM | BODY MASS INDEX: 35.67 KG/M2 | HEART RATE: 88 BPM | TEMPERATURE: 98.7 F | WEIGHT: 195 LBS | SYSTOLIC BLOOD PRESSURE: 112 MMHG

## 2023-01-13 DIAGNOSIS — J02.9 SORE THROAT: Primary | ICD-10-CM

## 2023-01-13 DIAGNOSIS — J02.9 ACUTE PHARYNGITIS, UNSPECIFIED ETIOLOGY: ICD-10-CM

## 2023-01-13 LAB
INFLUENZA VIRUS A RNA: NEGATIVE
INFLUENZA VIRUS B RNA: NEGATIVE
Lab: NORMAL
QC PASS/FAIL: NORMAL
S PYO AG THROAT QL: NORMAL
SARS-COV-2 RDRP RESP QL NAA+PROBE: NEGATIVE

## 2023-01-13 PROCEDURE — 87880 STREP A ASSAY W/OPTIC: CPT | Performed by: NURSE PRACTITIONER

## 2023-01-13 PROCEDURE — 87502 INFLUENZA DNA AMP PROBE: CPT | Performed by: NURSE PRACTITIONER

## 2023-01-13 PROCEDURE — 87635 SARS-COV-2 COVID-19 AMP PRB: CPT | Performed by: NURSE PRACTITIONER

## 2023-01-13 PROCEDURE — 99213 OFFICE O/P EST LOW 20 MIN: CPT | Performed by: NURSE PRACTITIONER

## 2023-01-13 RX ORDER — LIDOCAINE HYDROCHLORIDE 20 MG/ML
15 SOLUTION OROPHARYNGEAL PRN
Qty: 100 ML | Refills: 0 | Status: SHIPPED | OUTPATIENT
Start: 2023-01-13

## 2023-01-13 RX ORDER — POLYMYXIN B SULFATE AND TRIMETHOPRIM 1; 10000 MG/ML; [USP'U]/ML
1 SOLUTION OPHTHALMIC EVERY 4 HOURS
Qty: 1 EACH | Refills: 0 | Status: CANCELLED | OUTPATIENT
Start: 2023-01-13 | End: 2023-01-23

## 2023-01-13 RX ORDER — AMOXICILLIN 500 MG/1
500 CAPSULE ORAL 3 TIMES DAILY
Qty: 30 CAPSULE | Refills: 0 | Status: SHIPPED | OUTPATIENT
Start: 2023-01-13 | End: 2023-01-23

## 2023-01-13 ASSESSMENT — ENCOUNTER SYMPTOMS
WHEEZING: 0
RHINORRHEA: 1
HEMOPTYSIS: 0
HEARTBURN: 0
COUGH: 1
SORE THROAT: 1

## 2023-01-13 ASSESSMENT — PATIENT HEALTH QUESTIONNAIRE - PHQ9
SUM OF ALL RESPONSES TO PHQ QUESTIONS 1-9: 0
SUM OF ALL RESPONSES TO PHQ9 QUESTIONS 1 & 2: 0
1. LITTLE INTEREST OR PLEASURE IN DOING THINGS: 0
2. FEELING DOWN, DEPRESSED OR HOPELESS: 0
SUM OF ALL RESPONSES TO PHQ QUESTIONS 1-9: 0

## 2023-01-13 NOTE — PROGRESS NOTES
[facility]  Dept: [phone]  Dept Fax: [address] [phone]     Visit Date:  1/13/2023      Patient:  Alcus Eisenmenger  YOB: 1975    HPI:     Chief Complaint   Patient presents with    Cough     C/O fever, sore throat, cough, diarrhea, and pink eye x 3-4 days. Pt presents to the office today for sore throat, fever and cough. Symptoms started about 4 days ago. Pink eye treated per eye doctor. Had diarrhea earlier in the week, this is better. COVID test negative at home. Very fatigued and headache. Cough  This is a new problem. The current episode started in the past 7 days. The problem has been gradually worsening. The cough is Non-productive. Associated symptoms include myalgias, nasal congestion, postnasal drip, rhinorrhea and a sore throat. Pertinent negatives include no chest pain, chills, ear congestion, ear pain, headaches, heartburn, hemoptysis, rash, sweats, weight loss or wheezing. She has tried cool air, body position changes and OTC cough suppressant for the symptoms. The treatment provided mild relief. There is no history of asthma, bronchiectasis, bronchitis, COPD, emphysema, environmental allergies or pneumonia. Pharyngitis  Associated symptoms include coughing, myalgias and a sore throat. Pertinent negatives include no chest pain, chills, headaches or rash. She has tried acetaminophen, drinking, eating, rest, sleep and lying down for the symptoms. The treatment provided mild relief.      Medications    Current Outpatient Medications:     amoxicillin (AMOXIL) 500 MG capsule, Take 1 capsule by mouth 3 times daily for 10 days, Disp: 30 capsule, Rfl: 0    lidocaine viscous hcl (XYLOCAINE) 2 % SOLN solution, Take 15 mLs by mouth as needed for Irritation, Disp: 100 mL, Rfl: 0    apixaban (ELIQUIS) 5 MG TABS tablet, Take 1 tablet by mouth 2 times daily, Disp: 60 tablet, Rfl: 2    The patient has No Known Allergies. Past Medical History  Kareen Ken  has a past medical history of Acute deep vein thrombosis (DVT) of right lower extremity ([address] Hyperlipidemia, and IBS (irritable bowel syndrome). Subjective:      Review of Systems   Constitutional:  Negative for chills and weight loss. HENT:  Positive for postnasal drip, rhinorrhea and sore throat. Negative for ear pain. Respiratory:  Positive for cough. Negative for hemoptysis and wheezing. Cardiovascular:  Negative for chest pain. Gastrointestinal:  Negative for heartburn. Musculoskeletal:  Positive for myalgias. Skin:  Negative for rash. Allergic/Immunologic: Negative for environmental allergies. Neurological:  Negative for headaches. Objective:     /64   Pulse 88   Temp 98.7 °F (37.1 °C) (Oral)   Resp 16   Wt 195 lb (88.5 kg)   BMI 35.67 kg/m²     Physical Exam  Vitals reviewed. Constitutional:       General: She is not in acute distress. Appearance: Normal appearance. She is well-developed. HENT:      Head: Normocephalic and atraumatic. Right Ear: Hearing, ear canal and external ear normal.      Left Ear: Hearing, ear canal and external ear normal.      Nose: Nose normal. No nasal tenderness. Mouth/Throat:      Lips: Pink. Mouth: Mucous membranes are moist. No oral lesions. Pharynx: Oropharynx is clear. Uvula midline. Eyes:      General:         Right eye: No discharge. Left eye: No discharge. Conjunctiva/sclera: Conjunctivae normal.   Neck:      Trachea: No tracheal deviation. Cardiovascular:      Rate and Rhythm: Normal rate and regular rhythm. Heart sounds: Normal heart sounds. No murmur heard. Pulmonary:      Effort: Pulmonary effort is normal. No respiratory distress. Breath sounds: Normal breath sounds. Abdominal:      General: Bowel sounds are normal.      Palpations: Abdomen is soft. Tenderness:  There is no abdominal tenderness. Musculoskeletal:      Cervical back: Full passive range of motion without pain and neck supple. Lymphadenopathy:      Head:      Right side of head: No submental, submandibular, tonsillar, preauricular, posterior auricular or occipital adenopathy. Left side of head: No submental, submandibular, tonsillar, preauricular, posterior auricular or occipital adenopathy. Cervical: No cervical adenopathy. Skin:     General: Skin is warm and dry. Findings: No rash. Neurological:      General: No focal deficit present. Mental Status: She is alert and oriented to person, place, and time. Coordination: Coordination normal.   Psychiatric:         Behavior: Behavior normal.         Thought Content: Thought content normal.         Judgment: Judgment normal.     Results for POC orders placed in visit on 01/13/23   POCT Influenza A/B DNA (Alere i)   Result Value Ref Range    Influenza virus A RNA negative     Influenza virus B RNA negative    POCT rapid strep A   Result Value Ref Range    Strep A Ag None Detected None Detected       Assessment/Plan:      Sridevi Shelley was seen today for cough. Diagnoses and all orders for this visit:    Sore throat  -     POCT rapid strep A  -     POCT Influenza A/B DNA (Alere i)  -     POCT COVID-19 Rapid, NAAT    Acute pharyngitis, unspecified etiology  -     amoxicillin (AMOXIL) 500 MG capsule; Take 1 capsule by mouth 3 times daily for 10 days  -     lidocaine viscous hcl (XYLOCAINE) 2 % SOLN solution; Take 15 mLs by mouth as needed for Irritation    - Rest and increase fluids  - Tylenol as needed for pain and fever  - COVID, Flu and rapid strep testing was negative in office. Will treat for possible strep since unable to send for throat culture and pt has severe throat pain and redness. Pt agreeable to plan.     - Good handwashing and clean all surfaces touched  - Call office with any questions or concerns, or if symptoms are getting worse or changing  - ER for any chest pain or SOB    Return if symptoms worsen or fail to improve. Patient given educational materials - see patient instructions. Discussed use, benefit, and side effects of prescribed medications. All patient questions answered. Pt voiced understanding.         Electronically signed by KELLY Mckeon CNP on 1/13/2023 at 11:14 AM

## 2023-02-09 ENCOUNTER — HOSPITAL ENCOUNTER (OUTPATIENT)
Dept: INTERVENTIONAL RADIOLOGY/VASCULAR | Age: 48
Discharge: HOME | End: 2023-02-09
Payer: COMMERCIAL

## 2023-02-09 DIAGNOSIS — I82.461 ACUTE DEEP VEIN THROMBOSIS (DVT) OF CALF MUSCLE VEIN OF RIGHT LOWER EXTREMITY (HCC): ICD-10-CM

## 2023-02-09 PROCEDURE — 93971 EXTREMITY STUDY: CPT

## 2023-02-10 ENCOUNTER — TELEPHONE (OUTPATIENT)
Dept: FAMILY MEDICINE CLINIC | Age: 48
End: 2023-02-10

## 2023-02-10 NOTE — TELEPHONE ENCOUNTER
----- Message from KELLY Castro CNP sent at 2/9/2023 12:25 PM EST -----  Normal ultrasound. No evidence of DVT. Could we check and see what the hematologist recommended with continuing versus stopping Eliquis?  TS

## 2023-02-10 NOTE — TELEPHONE ENCOUNTER
Spoke with patient. States that she had previously spoken with her hematologist this week and Eliquis was discontinued at that time. No need to call pt back unless new/different instructions.